# Patient Record
Sex: FEMALE | Race: ASIAN | NOT HISPANIC OR LATINO
[De-identification: names, ages, dates, MRNs, and addresses within clinical notes are randomized per-mention and may not be internally consistent; named-entity substitution may affect disease eponyms.]

---
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
## 2021-02-09 ENCOUNTER — LABORATORY RESULT (OUTPATIENT)
Age: 21
End: 2021-02-09

## 2021-02-09 ENCOUNTER — APPOINTMENT (OUTPATIENT)
Dept: OBGYN | Facility: CLINIC | Age: 21
End: 2021-02-09
Payer: MEDICAID

## 2021-02-09 VITALS
TEMPERATURE: 97.2 F | HEIGHT: 61 IN | BODY MASS INDEX: 27.94 KG/M2 | SYSTOLIC BLOOD PRESSURE: 100 MMHG | WEIGHT: 148 LBS | DIASTOLIC BLOOD PRESSURE: 60 MMHG

## 2021-02-09 DIAGNOSIS — Z11.3 ENCOUNTER FOR SCREENING FOR INFECTIONS WITH A PREDOMINANTLY SEXUAL MODE OF TRANSMISSION: ICD-10-CM

## 2021-02-09 DIAGNOSIS — Z87.09 PERSONAL HISTORY OF OTHER DISEASES OF THE RESPIRATORY SYSTEM: ICD-10-CM

## 2021-02-09 LAB
HCG UR QL: NEGATIVE
QUALITY CONTROL: YES

## 2021-02-09 PROCEDURE — 99385 PREV VISIT NEW AGE 18-39: CPT

## 2021-02-09 PROCEDURE — 81025 URINE PREGNANCY TEST: CPT

## 2021-02-09 PROCEDURE — 99072 ADDL SUPL MATRL&STAF TM PHE: CPT

## 2021-02-10 LAB
ALBUMIN SERPL ELPH-MCNC: 4.6 G/DL
ALP BLD-CCNC: 104 U/L
ALT SERPL-CCNC: 30 U/L
ANION GAP SERPL CALC-SCNC: 15 MMOL/L
AST SERPL-CCNC: 22 U/L
BILIRUB SERPL-MCNC: 0.4 MG/DL
BUN SERPL-MCNC: 9 MG/DL
C TRACH RRNA SPEC QL NAA+PROBE: NOT DETECTED
CALCIUM SERPL-MCNC: 9.6 MG/DL
CHLORIDE SERPL-SCNC: 99 MMOL/L
CO2 SERPL-SCNC: 25 MMOL/L
CREAT SERPL-MCNC: 0.71 MG/DL
DHEA-S SERPL-MCNC: 175 UG/DL
ESTIMATED AVERAGE GLUCOSE: 131 MG/DL
ESTRADIOL SERPL-MCNC: 34 PG/ML
FSH SERPL-MCNC: 4.3 IU/L
GLUCOSE SERPL-MCNC: 92 MG/DL
HBA1C MFR BLD HPLC: 6.2 %
HBV SURFACE AG SER QL: NONREACTIVE
HCV AB SER QL: NONREACTIVE
HCV S/CO RATIO: 0.22 S/CO
HIV1+2 AB SPEC QL IA.RAPID: NONREACTIVE
HPV HIGH+LOW RISK DNA PNL CVX: DETECTED
INSULIN SERPL-MCNC: 26.5 UU/ML
LH SERPL-ACNC: 14.2 IU/L
N GONORRHOEA RRNA SPEC QL NAA+PROBE: NOT DETECTED
POTASSIUM SERPL-SCNC: 4.6 MMOL/L
PROGEST SERPL-MCNC: <0.1 NG/ML
PROLACTIN SERPL-MCNC: 10.3 NG/ML
PROT SERPL-MCNC: 7.7 G/DL
SODIUM SERPL-SCNC: 139 MMOL/L
SOURCE AMPLIFICATION: NORMAL
SOURCE TP AMPLIFICATION: NORMAL
T PALLIDUM AB SER QL IA: NEGATIVE
T VAGINALIS RRNA SPEC QL NAA+PROBE: NOT DETECTED
T4 FREE SERPL-MCNC: 0.9 NG/DL
TESTOST SERPL-MCNC: 27.5 NG/DL
TSH SERPL-ACNC: 3.12 UIU/ML

## 2021-02-11 ENCOUNTER — RESULT CHARGE (OUTPATIENT)
Age: 21
End: 2021-02-11

## 2021-02-11 ENCOUNTER — NON-APPOINTMENT (OUTPATIENT)
Age: 21
End: 2021-02-11

## 2021-02-11 DIAGNOSIS — R87.810 CERVICAL HIGH RISK HUMAN PAPILLOMAVIRUS (HPV) DNA TEST POSITIVE: ICD-10-CM

## 2021-02-11 PROBLEM — Z87.09 HISTORY OF ASTHMA: Status: RESOLVED | Noted: 2021-02-11 | Resolved: 2021-02-11

## 2021-02-11 LAB
HCG UR QL: NEGATIVE
QUALITY CONTROL: YES

## 2021-02-11 NOTE — PHYSICAL EXAM
[Appropriately responsive] : appropriately responsive [Alert] : alert [No Acute Distress] : no acute distress [Oriented x3] : oriented x3 [Examination Of The Breasts] : a normal appearance [No Masses] : no breast masses were palpable [Labia Majora] : normal [Labia Minora] : normal [Normal] : normal [Uterine Adnexae] : normal

## 2021-02-13 NOTE — HISTORY OF PRESENT ILLNESS
[Menarche Age ____] : age at menarche was [unfilled] [Lighter Bleeding] : bleeding has been lighter than normal [Irregular Cycle Intervals] : are  irregular [Condoms] : uses condoms [Y] : Patient is sexually active [Monogamous (Male Partner)] : is monogamous with a male partner [Patient would like to be screened for STIs] : Patient would like to be screened for STIs [FreeTextEntry1] : 21 yo presents for initial annual visit and concerns of irregular periods.  Patient states she was evaluated in the past but she does not remember diagnosis and no treatment was provided [LMPDate] : 11/25/20

## 2021-02-16 LAB — CYTOLOGY CVX/VAG DOC THIN PREP: ABNORMAL

## 2021-03-02 ENCOUNTER — EMERGENCY (EMERGENCY)
Facility: HOSPITAL | Age: 21
LOS: 1 days | Discharge: HOME | End: 2021-03-02
Attending: EMERGENCY MEDICINE | Admitting: EMERGENCY MEDICINE
Payer: MEDICAID

## 2021-03-02 VITALS
TEMPERATURE: 98 F | HEART RATE: 73 BPM | SYSTOLIC BLOOD PRESSURE: 126 MMHG | RESPIRATION RATE: 16 BRPM | OXYGEN SATURATION: 100 % | DIASTOLIC BLOOD PRESSURE: 72 MMHG

## 2021-03-02 PROCEDURE — 99283 EMERGENCY DEPT VISIT LOW MDM: CPT

## 2021-03-02 NOTE — ED ADULT TRIAGE NOTE - CHIEF COMPLAINT QUOTE
c/o HA after heavy bottle falling on her head at work earlier today. Denies LOC, vision changes, N+V. PMHx asthma.

## 2021-03-03 RX ORDER — ACETAMINOPHEN 500 MG
650 TABLET ORAL ONCE
Refills: 0 | Status: COMPLETED | OUTPATIENT
Start: 2021-03-03 | End: 2021-03-03

## 2021-03-03 RX ADMIN — Medication 650 MILLIGRAM(S): at 01:42

## 2021-03-03 NOTE — ED ADULT NURSE NOTE - OBJECTIVE STATEMENT
pt arrives w/ c/o headache. pt states a heavy box fell on her head. pt denies any blurry vision, nausea, vomiting. gvn pain meds will continue to monitor.

## 2021-03-03 NOTE — ED PROVIDER NOTE - LOCATION
head

## 2021-03-03 NOTE — ED PROVIDER NOTE - PATIENT PORTAL LINK FT
You can access the FollowMyHealth Patient Portal offered by [facility] by registering at the following website: [email or web address]. By joining [facility]’s FollowMyHealth portal, you will also be able to view your health information using other applications (apps) compatible with our system.

## 2021-03-03 NOTE — ED PROVIDER NOTE - CHPI ED SYMPTOMS NEG
no blurred vision/no dizziness/no loss of consciousness/no nausea/no vomiting/no weakness no blurred vision/no dizziness/no loss of consciousness/no nausea/no vomiting/no weakness/no change in level of consciousness

## 2021-03-03 NOTE — ED PROVIDER NOTE - OBJECTIVE STATEMENT
21 y/o female with no pertinent PMHx presents to the ED with c/o mild BLACKWELL. Pt states was at work last night at approximately 6 pm a bottle fell hitting her to the top of head. Pt denies any LOC, N/V, weakness, dizziness, or change in vision. 21 y/o female with no significant PMHx presents to the ER c/o mild headache x 1 day. Pt states she was at work at 6pm and a plastic bottle fell hitting her on the top of the head. Pt denies LOC, nausea, vomiting, weakness, dizziness, change in vision.

## 2021-03-03 NOTE — ED PROVIDER NOTE - ATTENDING CONTRIBUTION TO CARE
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  No vomiting or LOC. Normal neuro exam.  No LOC, No vomiting, no trouble with vision or walking, no weakness, no neck pain, no numbness.  VS:  unremarkable    GEN - mild distress HA;   A+O x3   HEAD - NC/AT   except for mild ttp L high parietal mild ttp no deformity or laceration there  ENT - PEERL, EOMI, mucous membranes    moist , no discharge    Specifically no C-spine ttp.   NECK: Neck supple, non-tender without lymphadenopathy, no masses, no JVD  PULM - CTA b/l,  symmetric breath sounds  COR -  normal heart sounds    ABD - , ND, NT, soft,  BACK - no CVA tenderness, nontender spine     EXTREMS - no edema, no deformity, warm and well perfused    SKIN - no rash    or bruising      NEUROLOGIC - alert, face symmetric, speech fluent, sensation nl, motor no focal deficit.

## 2021-03-09 ENCOUNTER — APPOINTMENT (OUTPATIENT)
Dept: OBGYN | Facility: CLINIC | Age: 21
End: 2021-03-09

## 2021-04-09 ENCOUNTER — APPOINTMENT (OUTPATIENT)
Dept: OBGYN | Facility: CLINIC | Age: 21
End: 2021-04-09

## 2022-01-04 PROBLEM — Z78.9 OTHER SPECIFIED HEALTH STATUS: Chronic | Status: ACTIVE | Noted: 2021-03-03

## 2022-01-25 ENCOUNTER — APPOINTMENT (OUTPATIENT)
Dept: OBGYN | Facility: CLINIC | Age: 22
End: 2022-01-25
Payer: MEDICAID

## 2022-01-25 VITALS
DIASTOLIC BLOOD PRESSURE: 62 MMHG | HEIGHT: 61 IN | BODY MASS INDEX: 29.45 KG/M2 | SYSTOLIC BLOOD PRESSURE: 110 MMHG | WEIGHT: 156 LBS

## 2022-01-25 PROCEDURE — 99213 OFFICE O/P EST LOW 20 MIN: CPT

## 2022-01-25 RX ORDER — MEDROXYPROGESTERONE ACETATE 10 MG/1
10 TABLET ORAL DAILY
Qty: 7 | Refills: 0 | Status: DISCONTINUED | COMMUNITY
Start: 2021-02-11 | End: 2022-01-25

## 2022-01-25 NOTE — REVIEW OF SYSTEMS
[Back Pain] : back pain [Anxiety] : anxiety [History of Anemia] : history of anemia [Negative] : Heme/Lymph

## 2022-01-25 NOTE — HISTORY OF PRESENT ILLNESS
[Y] : Patient reports abnormal menses [Irregular Cycle Intervals] : are  irregular [Patient refuses STI testing] : Patient refuses STI testing [FreeTextEntry1] : 20 yo presents with concerns of irregular periods.  [PapSmeardate] : 2/2021 [LMPDate] : 10/31/21

## 2022-01-26 LAB
ALBUMIN SERPL ELPH-MCNC: 5.2 G/DL
ALP BLD-CCNC: 111 U/L
ALT SERPL-CCNC: 50 U/L
ANION GAP SERPL CALC-SCNC: 20 MMOL/L
AST SERPL-CCNC: 36 U/L
BASOPHILS # BLD AUTO: 0.08 K/UL
BASOPHILS NFR BLD AUTO: 0.8 %
BILIRUB SERPL-MCNC: 0.3 MG/DL
BUN SERPL-MCNC: 10 MG/DL
CALCIUM SERPL-MCNC: 10.3 MG/DL
CHLORIDE SERPL-SCNC: 101 MMOL/L
CO2 SERPL-SCNC: 20 MMOL/L
CREAT SERPL-MCNC: 0.69 MG/DL
DHEA-S SERPL-MCNC: 132 UG/DL
EOSINOPHIL # BLD AUTO: 0.33 K/UL
EOSINOPHIL NFR BLD AUTO: 3.4 %
ESTIMATED AVERAGE GLUCOSE: 131 MG/DL
ESTRADIOL SERPL-MCNC: 49 PG/ML
FSH SERPL-MCNC: 4 IU/L
GLUCOSE SERPL-MCNC: 105 MG/DL
HBA1C MFR BLD HPLC: 6.2 %
HCG SERPL-MCNC: <1 MIU/ML
HCT VFR BLD CALC: 37.7 %
HGB BLD-MCNC: 11.7 G/DL
IMM GRANULOCYTES NFR BLD AUTO: 0.3 %
INSULIN SERPL-MCNC: 56.9 UU/ML
LH SERPL-ACNC: 23.5 IU/L
LYMPHOCYTES # BLD AUTO: 2.26 K/UL
LYMPHOCYTES NFR BLD AUTO: 23.4 %
MAN DIFF?: NORMAL
MCHC RBC-ENTMCNC: 24 PG
MCHC RBC-ENTMCNC: 31 GM/DL
MCV RBC AUTO: 77.4 FL
MONOCYTES # BLD AUTO: 0.54 K/UL
MONOCYTES NFR BLD AUTO: 5.6 %
NEUTROPHILS # BLD AUTO: 6.43 K/UL
NEUTROPHILS NFR BLD AUTO: 66.5 %
PLATELET # BLD AUTO: 313 K/UL
POTASSIUM SERPL-SCNC: 4.6 MMOL/L
PROGEST SERPL-MCNC: 0.1 NG/ML
PROLACTIN SERPL-MCNC: 25.3 NG/ML
PROT SERPL-MCNC: 8.3 G/DL
RBC # BLD: 4.87 M/UL
RBC # FLD: 15.3 %
SODIUM SERPL-SCNC: 141 MMOL/L
T4 FREE SERPL-MCNC: 0.9 NG/DL
TESTOST SERPL-MCNC: 31.2 NG/DL
TSH SERPL-ACNC: 6.48 UIU/ML
WBC # FLD AUTO: 9.67 K/UL

## 2022-01-28 ENCOUNTER — NON-APPOINTMENT (OUTPATIENT)
Age: 22
End: 2022-01-28

## 2022-02-04 ENCOUNTER — APPOINTMENT (OUTPATIENT)
Dept: OBGYN | Facility: CLINIC | Age: 22
End: 2022-02-04
Payer: MEDICAID

## 2022-02-04 VITALS
DIASTOLIC BLOOD PRESSURE: 64 MMHG | WEIGHT: 157 LBS | SYSTOLIC BLOOD PRESSURE: 118 MMHG | BODY MASS INDEX: 29.64 KG/M2 | HEIGHT: 61 IN

## 2022-02-04 DIAGNOSIS — E07.9 DISORDER OF THYROID, UNSPECIFIED: ICD-10-CM

## 2022-02-04 PROCEDURE — 76830 TRANSVAGINAL US NON-OB: CPT

## 2022-02-04 PROCEDURE — 99213 OFFICE O/P EST LOW 20 MIN: CPT | Mod: 25

## 2022-02-04 NOTE — PLAN
[FreeTextEntry1] : Discussed pcos and metabolic differences.\par Discussed increased risk of dm, thyroid disease and obesity.\par Reviewed possible skin and hair changes as well as menstrual irregularity and infertility.\par Recommended increased exercise and lower calorie lower carb diet.\par Discussed off label use of metformin and risks.\par \par Fasting prolactin and other bloods drawn.\par \par Discussed birth control options.\par OCP, ring, patch, Mirena, Paragard, Nexplanon, barrier, progesterone only including minipill, depoprovera.\par \par Risk reviewed including Blood Clots (DVT and PE), Migraines, High Blood Pressure, Heart Attack, Stroke or Unplanned Pregnancy discussed (failure).\par

## 2022-02-07 LAB
CHOLEST SERPL-MCNC: 208 MG/DL
HDLC SERPL-MCNC: 41 MG/DL
LDLC SERPL CALC-MCNC: 125 MG/DL
NONHDLC SERPL-MCNC: 168 MG/DL
PROLACTIN SERPL-MCNC: 31.8 NG/ML
T3FREE SERPL-MCNC: 3.21 PG/ML
T4 FREE SERPL-MCNC: 1 NG/DL
THYROGLOB AB SERPL-ACNC: <20 IU/ML
THYROPEROXIDASE AB SERPL IA-ACNC: <10 IU/ML
TRIGL SERPL-MCNC: 216 MG/DL
TSH SERPL-ACNC: 4.75 UIU/ML

## 2022-02-11 LAB — T3REVERSE SERPL-MCNC: 12.4 NG/DL

## 2022-02-18 ENCOUNTER — APPOINTMENT (OUTPATIENT)
Dept: OBGYN | Facility: CLINIC | Age: 22
End: 2022-02-18
Payer: MEDICAID

## 2022-02-18 ENCOUNTER — APPOINTMENT (OUTPATIENT)
Dept: OBGYN | Facility: CLINIC | Age: 22
End: 2022-02-18

## 2022-02-18 DIAGNOSIS — R79.89 OTHER SPECIFIED ABNORMAL FINDINGS OF BLOOD CHEMISTRY: ICD-10-CM

## 2022-02-18 PROCEDURE — 99213 OFFICE O/P EST LOW 20 MIN: CPT | Mod: 95

## 2022-02-18 NOTE — PLAN
[FreeTextEntry1] : Discussed birth control options.\par OCP, ring, patch, Mirena, Paragard, Nexplanon, barrier, progesterone only including minipill, depoprovera.\par \par Risk reviewed including Blood Clots (DVT and PE), Migraines, High Blood Pressure, Heart Attack, Stroke or Unplanned Pregnancy discussed (failure). \par \par Discussed subclinical hypothyroid, prediabetes as well as elevated prolactin. No mri at this time.  FU 6 months to reevaluate.

## 2022-02-18 NOTE — HISTORY OF PRESENT ILLNESS
[FreeTextEntry1] : Call to review results and desires to start ocp.  Patient at home and myself in [facility].  Reviewed that telehealth visit would be billed.

## 2022-07-12 ENCOUNTER — APPOINTMENT (OUTPATIENT)
Dept: OBGYN | Facility: CLINIC | Age: 22
End: 2022-07-12

## 2022-07-19 ENCOUNTER — APPOINTMENT (OUTPATIENT)
Dept: OBGYN | Facility: CLINIC | Age: 22
End: 2022-07-19

## 2022-07-19 VITALS
HEIGHT: 61 IN | DIASTOLIC BLOOD PRESSURE: 60 MMHG | WEIGHT: 146 LBS | SYSTOLIC BLOOD PRESSURE: 100 MMHG | BODY MASS INDEX: 27.56 KG/M2

## 2022-07-19 PROCEDURE — 99395 PREV VISIT EST AGE 18-39: CPT

## 2022-07-19 NOTE — DISCUSSION/SUMMARY
[FreeTextEntry1] : Risk of Oral Contraceptive Pills including Blood Clots, Migraines, High Blood Pressure, Heart Attack, Stroke or Unplanned Pregnancy discussed\par RTO in 6 months

## 2022-07-19 NOTE — HISTORY OF PRESENT ILLNESS
[Patient reported PAP Smear was normal] : Patient reported PAP Smear was normal [N] : Patient reports normal menses [Oral Contraceptive] : uses oral contraception pills [Y] : Patient is sexually active [Monogamous (Male Partner)] : is monogamous with a male partner [Patient refuses STI testing] : Patient refuses STI testing [FreeTextEntry1] : 22 yo presents annual exam and pap smear.  No complaints.  Desire birth control refill. [PapSmeardate] : 2/2021 [LMPDate] : 7/7/2022

## 2022-07-26 LAB — CYTOLOGY CVX/VAG DOC THIN PREP: ABNORMAL

## 2022-10-20 ENCOUNTER — RX RENEWAL (OUTPATIENT)
Age: 22
End: 2022-10-20

## 2023-02-14 ENCOUNTER — APPOINTMENT (OUTPATIENT)
Dept: OBGYN | Facility: CLINIC | Age: 23
End: 2023-02-14
Payer: MEDICAID

## 2023-02-14 VITALS
SYSTOLIC BLOOD PRESSURE: 100 MMHG | BODY MASS INDEX: 28.7 KG/M2 | DIASTOLIC BLOOD PRESSURE: 70 MMHG | WEIGHT: 152 LBS | HEIGHT: 61 IN

## 2023-02-14 PROCEDURE — 99213 OFFICE O/P EST LOW 20 MIN: CPT

## 2023-02-14 NOTE — HISTORY OF PRESENT ILLNESS
[N] : Patient does not use contraception [Patient refuses STI testing] : Patient refuses STI testing [FreeTextEntry1] : 23 yo presents for birth control refill.  No complaints at this time. [PapSmeardate] : 7/19/22 [LMPDate] : 1/26/23

## 2023-08-01 ENCOUNTER — APPOINTMENT (OUTPATIENT)
Dept: OBGYN | Facility: CLINIC | Age: 23
End: 2023-08-01
Payer: MEDICAID

## 2023-08-01 VITALS
SYSTOLIC BLOOD PRESSURE: 110 MMHG | WEIGHT: 142 LBS | BODY MASS INDEX: 26.81 KG/M2 | DIASTOLIC BLOOD PRESSURE: 70 MMHG | HEIGHT: 61 IN

## 2023-08-01 DIAGNOSIS — J45.909 UNSPECIFIED ASTHMA, UNCOMPLICATED: ICD-10-CM

## 2023-08-01 PROCEDURE — 99213 OFFICE O/P EST LOW 20 MIN: CPT

## 2023-08-01 RX ORDER — ALBUTEROL SULFATE 90 UG/1
108 (90 BASE) AEROSOL, METERED RESPIRATORY (INHALATION)
Qty: 1 | Refills: 1 | Status: ACTIVE | COMMUNITY

## 2023-08-01 NOTE — HISTORY OF PRESENT ILLNESS
[FreeTextEntry1] : Menses regulated with ocp, desires refill.  Asthma mild and needs refill on ventolin. Refuses pelvic exam and pap at this time.

## 2023-08-01 NOTE — REVIEW OF SYSTEMS
[Wheezing] : wheezing [Arthralgias] : arthralgias [Back Pain] : back pain [Headache] : headache [Anxiety] : anxiety [Negative] : Heme/Lymph

## 2023-08-01 NOTE — PLAN
[FreeTextEntry1] : Asthma refill ventolin.  Call if using >3x per week.  Refill ocp.  Return for PAP

## 2024-01-26 ENCOUNTER — APPOINTMENT (OUTPATIENT)
Dept: OBGYN | Facility: CLINIC | Age: 24
End: 2024-01-26
Payer: MEDICAID

## 2024-01-26 VITALS
WEIGHT: 145 LBS | HEIGHT: 61 IN | SYSTOLIC BLOOD PRESSURE: 116 MMHG | DIASTOLIC BLOOD PRESSURE: 70 MMHG | BODY MASS INDEX: 27.38 KG/M2

## 2024-01-26 DIAGNOSIS — N92.6 IRREGULAR MENSTRUATION, UNSPECIFIED: ICD-10-CM

## 2024-01-26 PROCEDURE — 99395 PREV VISIT EST AGE 18-39: CPT

## 2024-01-26 NOTE — HISTORY OF PRESENT ILLNESS
[FreeTextEntry1] : Check up.  Feels well.  Desires to refill ocp.  Refuses sti screen. Hx prediabetes, thyroid abn and elevated prolactin.

## 2024-01-27 LAB
ALBUMIN SERPL ELPH-MCNC: 4.7 G/DL
ALP BLD-CCNC: 87 U/L
ALT SERPL-CCNC: 21 U/L
ANION GAP SERPL CALC-SCNC: 16 MMOL/L
AST SERPL-CCNC: 18 U/L
BILIRUB SERPL-MCNC: 0.5 MG/DL
BUN SERPL-MCNC: 10 MG/DL
CALCIUM SERPL-MCNC: 9.6 MG/DL
CHLORIDE SERPL-SCNC: 100 MMOL/L
CO2 SERPL-SCNC: 22 MMOL/L
CREAT SERPL-MCNC: 0.65 MG/DL
DHEA-S SERPL-MCNC: 204 UG/DL
EGFR: 125 ML/MIN/1.73M2
ESTIMATED AVERAGE GLUCOSE: 117 MG/DL
ESTRADIOL SERPL-MCNC: 198 PG/ML
FSH SERPL-MCNC: 1.8 IU/L
GLUCOSE SERPL-MCNC: 80 MG/DL
HBA1C MFR BLD HPLC: 5.7 %
HCT VFR BLD CALC: 37.1 %
HGB BLD-MCNC: 11.7 G/DL
INSULIN SERPL-MCNC: 28.3 UU/ML
LH SERPL-ACNC: 5.9 IU/L
MCHC RBC-ENTMCNC: 26.3 PG
MCHC RBC-ENTMCNC: 31.5 GM/DL
MCV RBC AUTO: 83.4 FL
PLATELET # BLD AUTO: 282 K/UL
POTASSIUM SERPL-SCNC: 4.4 MMOL/L
PROGEST SERPL-MCNC: 8.5 NG/ML
PROLACTIN SERPL-MCNC: 33.5 NG/ML
PROT SERPL-MCNC: 7.7 G/DL
RBC # BLD: 4.45 M/UL
RBC # FLD: 14.3 %
SODIUM SERPL-SCNC: 137 MMOL/L
T4 FREE SERPL-MCNC: 1 NG/DL
TESTOST SERPL-MCNC: 15.4 NG/DL
TSH SERPL-ACNC: 4.63 UIU/ML
WBC # FLD AUTO: 10.59 K/UL

## 2024-01-31 ENCOUNTER — NON-APPOINTMENT (OUTPATIENT)
Age: 24
End: 2024-01-31

## 2024-02-20 ENCOUNTER — NON-APPOINTMENT (OUTPATIENT)
Age: 24
End: 2024-02-20

## 2024-03-29 ENCOUNTER — APPOINTMENT (OUTPATIENT)
Dept: OBGYN | Facility: CLINIC | Age: 24
End: 2024-03-29
Payer: MEDICAID

## 2024-03-29 VITALS
BODY MASS INDEX: 27.75 KG/M2 | SYSTOLIC BLOOD PRESSURE: 112 MMHG | DIASTOLIC BLOOD PRESSURE: 60 MMHG | HEIGHT: 61 IN | WEIGHT: 147 LBS

## 2024-03-29 DIAGNOSIS — R73.03 PREDIABETES.: ICD-10-CM

## 2024-03-29 DIAGNOSIS — Z01.419 ENCOUNTER FOR GYNECOLOGICAL EXAMINATION (GENERAL) (ROUTINE) W/OUT ABNORMAL FINDINGS: ICD-10-CM

## 2024-03-29 PROCEDURE — 99395 PREV VISIT EST AGE 18-39: CPT | Mod: 25

## 2024-03-29 PROCEDURE — 81003 URINALYSIS AUTO W/O SCOPE: CPT | Mod: NC,QW

## 2024-03-29 PROCEDURE — 76817 TRANSVAGINAL US OBSTETRIC: CPT

## 2024-03-29 PROCEDURE — 81025 URINE PREGNANCY TEST: CPT

## 2024-04-01 LAB
BACTERIA UR CULT: NORMAL
BILIRUB UR QL STRIP: NORMAL
C TRACH RRNA SPEC QL NAA+PROBE: NOT DETECTED
GLUCOSE UR-MCNC: NORMAL
HCG UR QL: 0.2 EU/DL
HCG UR QL: POSITIVE
HGB UR QL STRIP.AUTO: NORMAL
KETONES UR-MCNC: NORMAL
LEUKOCYTE ESTERASE UR QL STRIP: NORMAL
N GONORRHOEA RRNA SPEC QL NAA+PROBE: NOT DETECTED
NITRITE UR QL STRIP: NORMAL
PH UR STRIP: 7
PROT UR STRIP-MCNC: 30
QUALITY CONTROL: YES
SOURCE AMPLIFICATION: NORMAL
SOURCE AMPLIFICATION: NORMAL
SP GR UR STRIP: 1.02
T VAGINALIS RRNA SPEC QL NAA+PROBE: NOT DETECTED

## 2024-04-01 NOTE — REVIEW OF SYSTEMS
[Recent Weight Gain (___ Lbs)] : recent [unfilled] ~Ulb weight gain [Cough] : cough [Wheezing] : wheezing [SOB on Exertion] : shortness of breath on exertion [Breast Pain] : breast pain [Arthralgias] : arthralgias [Back Pain] : back pain [Headache] : headache [Anxiety] : anxiety [History of Anemia] : history of anemia [Negative] : Endocrine

## 2024-04-01 NOTE — PHYSICAL EXAM
[Chaperone Present] : A chaperone was present in the examining room during all aspects of the physical examination [Appropriately responsive] : appropriately responsive [Alert] : alert [No Acute Distress] : no acute distress [No Lymphadenopathy] : no lymphadenopathy [Non-tender] : non-tender [Soft] : soft [Non-distended] : non-distended [No HSM] : No HSM [No Mass] : no mass [No Lesions] : no lesions [Oriented x3] : oriented x3 [Examination Of The Breasts] : a normal appearance [No Masses] : no breast masses were palpable [Labia Majora] : normal [Labia Minora] : normal [Normal] : normal [Uterine Adnexae] : normal [Declined] : Patient declined rectal exam

## 2024-04-02 ENCOUNTER — NON-APPOINTMENT (OUTPATIENT)
Age: 24
End: 2024-04-02

## 2024-04-05 ENCOUNTER — APPOINTMENT (OUTPATIENT)
Dept: OBGYN | Facility: CLINIC | Age: 24
End: 2024-04-05
Payer: MEDICAID

## 2024-04-05 VITALS
SYSTOLIC BLOOD PRESSURE: 130 MMHG | BODY MASS INDEX: 27.94 KG/M2 | WEIGHT: 148 LBS | HEIGHT: 61 IN | DIASTOLIC BLOOD PRESSURE: 70 MMHG

## 2024-04-05 DIAGNOSIS — Z32.01 ENCOUNTER FOR PREGNANCY TEST, RESULT POSITIVE: ICD-10-CM

## 2024-04-05 PROCEDURE — 99213 OFFICE O/P EST LOW 20 MIN: CPT | Mod: TH,25

## 2024-04-05 PROCEDURE — S0190: CPT

## 2024-04-05 PROCEDURE — S0191: CPT

## 2024-04-05 NOTE — PHYSICAL EXAM
[Labia Majora] : normal [Labia Minora] : normal [Normal] : normal [Enlarged ___ wks] : enlarged [unfilled] ~Uweeks [Uterine Adnexae] : normal [Declined] : Patient declined rectal exam

## 2024-04-05 NOTE — REVIEW OF SYSTEMS
[Recent Weight Gain (___ Lbs)] : recent [unfilled] ~Ulb weight gain [Cough] : cough [SOB on Exertion] : shortness of breath on exertion [Wheezing] : wheezing [Vomiting] : vomiting [Nausea] : nausea [Breast Pain] : breast pain [Arthralgias] : arthralgias [Back Pain] : back pain [Headache] : headache [Anxiety] : anxiety [History of Anemia] : history of anemia [Negative] : Heme/Lymph

## 2024-04-05 NOTE — PLAN
[FreeTextEntry1] : Type and screen BHCG drawn Reviewed options.   Conservative management (watch and wait)  discussed risk of bleeding, infection, failure, possible need for surgery, and perforation if surgery required. Surgical treatment DVC.  Reviewed risks of bleeding infection perforation. Medical treatment - discussed risk of bleeding, infection, failure, possible need for surgery, and perforation if surgery required.  Mifeprex 200mcg PO given in office. Cytotec 800mcg buccal x 2 doses provided. Start in 12-48 hours and repeat dose after 12 hours. Verbal and written direction provided.

## 2024-04-08 LAB
ABO + RH PNL BLD: NORMAL
BLD GP AB SCN SERPL QL: NORMAL
HCG SERPL-MCNC: ABNORMAL MIU/ML

## 2024-04-26 ENCOUNTER — APPOINTMENT (OUTPATIENT)
Dept: OBGYN | Facility: CLINIC | Age: 24
End: 2024-04-26
Payer: MEDICAID

## 2024-04-26 VITALS
SYSTOLIC BLOOD PRESSURE: 110 MMHG | DIASTOLIC BLOOD PRESSURE: 60 MMHG | HEIGHT: 61 IN | WEIGHT: 147 LBS | BODY MASS INDEX: 27.75 KG/M2

## 2024-04-26 DIAGNOSIS — Z30.09 ENCOUNTER FOR OTHER GENERAL COUNSELING AND ADVICE ON CONTRACEPTION: ICD-10-CM

## 2024-04-26 DIAGNOSIS — E28.2 POLYCYSTIC OVARIAN SYNDROME: ICD-10-CM

## 2024-04-26 DIAGNOSIS — Z64.0 PROBLEMS RELATED TO UNWANTED PREGNANCY: ICD-10-CM

## 2024-04-26 PROCEDURE — 99213 OFFICE O/P EST LOW 20 MIN: CPT

## 2024-04-26 RX ORDER — NORETHINDRONE ACETATE/ETHINYL ESTRADIOL 1MG-20MCG
1-20 KIT ORAL
Qty: 3 | Refills: 1 | Status: ACTIVE | COMMUNITY
Start: 2022-02-18 | End: 1900-01-01

## 2024-04-26 NOTE — PLAN
[FreeTextEntry1] : Discussed birth control options. Combined estrogen and progesterone OCP, ring, patch. Risk reviewed including Blood Clots (DVT and PE), Migraines, High Blood Pressure, Heart Attack, Stroke or Unplanned Pregnancy discussed (failure). Non hormonal Barrier - condoms and paragard. Progesterone only Mirena, Nexplanon, minipill, depoprovera.

## 2024-04-26 NOTE — PHYSICAL EXAM
[Appropriately responsive] : appropriately responsive [Alert] : alert [No Acute Distress] : no acute distress [No Lymphadenopathy] : no lymphadenopathy [Soft] : soft [Non-tender] : non-tender [Non-distended] : non-distended [No Lesions] : no lesions [No HSM] : No HSM [No Mass] : no mass [Oriented x3] : oriented x3 [Examination Of The Breasts] : a normal appearance [No Masses] : no breast masses were palpable [Labia Majora] : normal [Labia Minora] : normal [Normal] : normal [Enlarged ___ wks] : enlarged [unfilled] ~Uweeks [Uterine Adnexae] : normal [Declined] : Patient declined rectal exam

## 2024-04-30 LAB — CYTOLOGY CVX/VAG DOC THIN PREP: ABNORMAL

## 2024-08-23 ENCOUNTER — APPOINTMENT (OUTPATIENT)
Dept: OBGYN | Facility: CLINIC | Age: 24
End: 2024-08-23

## 2024-09-27 ENCOUNTER — APPOINTMENT (OUTPATIENT)
Dept: OBGYN | Facility: CLINIC | Age: 24
End: 2024-09-27
Payer: COMMERCIAL

## 2024-09-27 VITALS
WEIGHT: 140 LBS | BODY MASS INDEX: 26.43 KG/M2 | DIASTOLIC BLOOD PRESSURE: 62 MMHG | SYSTOLIC BLOOD PRESSURE: 94 MMHG | HEIGHT: 61 IN

## 2024-09-27 DIAGNOSIS — Z30.09 ENCOUNTER FOR OTHER GENERAL COUNSELING AND ADVICE ON CONTRACEPTION: ICD-10-CM

## 2024-09-27 DIAGNOSIS — Z32.01 ENCOUNTER FOR PREGNANCY TEST, RESULT POSITIVE: ICD-10-CM

## 2024-09-27 PROCEDURE — 99213 OFFICE O/P EST LOW 20 MIN: CPT

## 2024-09-27 NOTE — HISTORY OF PRESENT ILLNESS
[FreeTextEntry1] : 24 yo presents for refill of OCP.  No complaints at this time. [Patient reported PAP Smear was normal] : Patient reported PAP Smear was normal [Y] : Patient is sexually active [Monogamous (Male Partner)] : is monogamous with a male partner [PapSmeardate] : 4/2024 [LMPDate] : 8/16/24 [Patient refuses STI testing] : Patient refuses STI testing

## 2025-03-14 ENCOUNTER — APPOINTMENT (OUTPATIENT)
Dept: OBGYN | Facility: CLINIC | Age: 25
End: 2025-03-14
Payer: COMMERCIAL

## 2025-03-14 ENCOUNTER — NON-APPOINTMENT (OUTPATIENT)
Age: 25
End: 2025-03-14

## 2025-03-14 VITALS
BODY MASS INDEX: 29.83 KG/M2 | DIASTOLIC BLOOD PRESSURE: 82 MMHG | SYSTOLIC BLOOD PRESSURE: 119 MMHG | WEIGHT: 158 LBS | HEIGHT: 61 IN

## 2025-03-14 DIAGNOSIS — E28.2 POLYCYSTIC OVARIAN SYNDROME: ICD-10-CM

## 2025-03-14 DIAGNOSIS — Z30.09 ENCOUNTER FOR OTHER GENERAL COUNSELING AND ADVICE ON CONTRACEPTION: ICD-10-CM

## 2025-03-14 PROCEDURE — 99213 OFFICE O/P EST LOW 20 MIN: CPT

## 2025-07-11 ENCOUNTER — APPOINTMENT (OUTPATIENT)
Dept: OBGYN | Facility: CLINIC | Age: 25
End: 2025-07-11

## 2025-07-11 VITALS — HEIGHT: 60 IN | DIASTOLIC BLOOD PRESSURE: 70 MMHG | SYSTOLIC BLOOD PRESSURE: 118 MMHG

## 2025-07-11 PROCEDURE — 81025 URINE PREGNANCY TEST: CPT

## 2025-07-11 PROCEDURE — 99213 OFFICE O/P EST LOW 20 MIN: CPT | Mod: 25

## 2025-07-11 RX ORDER — PANTOPRAZOLE 40 MG/1
40 TABLET, DELAYED RELEASE ORAL DAILY
Qty: 30 | Refills: 1 | Status: ACTIVE | COMMUNITY
Start: 2025-07-11 | End: 1900-01-01

## 2025-07-12 LAB
HCG UR QL: NEGATIVE
QUALITY CONTROL: YES